# Patient Record
(demographics unavailable — no encounter records)

---

## 2025-03-24 NOTE — ASSESSMENT
[FreeTextEntry1] : 48yr male with no pmhx came to establish care. Complaint lower back pain, work related, advise stretching exercises. Had blood work done outside, result pending. No fever, sob, chest pain, renal angle tenderness, urinary symptoms.   # Back pain # mild obesity  - work related - advised PRN nsaids and strectching exercised.  - cyclobenzaprine 10mg TID prn  # Eczema unspecified  - hydrocortisone cream - derm referral    # offer colonoscopy: denied. does not take any medication.  f/u lab works in 2 months

## 2025-03-24 NOTE — HISTORY OF PRESENT ILLNESS
[FreeTextEntry1] : 48 yr male came to establish care.  [de-identified] : 48yr male with no pmhx came to establish care. Complaint lower back pain, work related, advise stretching exercises. Had blood work done outside, result pending. No fever, sob, chest pain, renal angle tenderness, urinary symptoms.

## 2025-03-24 NOTE — HISTORY OF PRESENT ILLNESS
[FreeTextEntry1] : 48 yr male came to establish care.  [de-identified] : 48yr male with no pmhx came to establish care. Complaint lower back pain, work related, advise stretching exercises. Had blood work done outside, result pending. No fever, sob, chest pain, renal angle tenderness, urinary symptoms.

## 2025-05-20 NOTE — PHYSICAL EXAM
[Normal] : normal rate, regular rhythm, normal S1 and S2 and no murmur heard [No Edema] : there was no peripheral edema [Soft] : abdomen soft [Non Tender] : non-tender [No HSM] : no HSM [Normal Bowel Sounds] : normal bowel sounds [Coordination Grossly Intact] : coordination grossly intact [No Focal Deficits] : no focal deficits [Normal Gait] : normal gait

## 2025-05-27 NOTE — PLAN
[FreeTextEntry1] : #HLD T.Chol 214, LDL 93, HDL 40,  - Counseled on lifestyle and dietary modification - Start Atorvastatin 20mg  #DM2 - A1c 6.1 03/2025 - Counseled on lifestyle and dietary modification  #Elevated PSA - PSA 4.95; no urinary symptoms - Father has prostate Ca - Urology referral  #Elevated ALP - No symptoms of gallstone disease - Will monitor  # Back pain - Resolved  #HCM - Colonoscopy deferred   RTC in 6 months

## 2025-05-27 NOTE — HISTORY OF PRESENT ILLNESS
[FreeTextEntry1] : Follow up [de-identified] : 48yr male with pre-diabetes and hyperlipidemia presenting for follow up. His back pain from last resolved has resolved. He has no active complaints. Denies any chest pain, shortness of breath, palpitations and/or diarrhea.

## 2025-05-27 NOTE — HISTORY OF PRESENT ILLNESS
[FreeTextEntry1] : Follow up [de-identified] : 48yr male with pre-diabetes and hyperlipidemia presenting for follow up. His back pain from last resolved has resolved. He has no active complaints. Denies any chest pain, shortness of breath, palpitations and/or diarrhea.